# Patient Record
Sex: FEMALE | Race: BLACK OR AFRICAN AMERICAN | NOT HISPANIC OR LATINO | Employment: UNEMPLOYED | URBAN - METROPOLITAN AREA
[De-identification: names, ages, dates, MRNs, and addresses within clinical notes are randomized per-mention and may not be internally consistent; named-entity substitution may affect disease eponyms.]

---

## 2017-11-02 ENCOUNTER — HOSPITAL ENCOUNTER (OUTPATIENT)
Dept: RADIOLOGY | Facility: OTHER | Age: 42
Discharge: HOME | End: 2017-11-02
Attending: ORTHOPAEDIC SURGERY
Payer: MEDICARE

## 2017-11-02 DIAGNOSIS — S93.492A RUPTURE OF MEDIAL ANKLE LIGAMENT, LEFT, INITIAL ENCOUNTER: ICD-10-CM

## 2017-11-02 PROCEDURE — 73721 MRI JNT OF LWR EXTRE W/O DYE: CPT | Mod: 26,LT,, | Performed by: RADIOLOGY

## 2017-11-02 PROCEDURE — 73721 MRI JNT OF LWR EXTRE W/O DYE: CPT | Mod: TC,LT

## 2017-11-14 DIAGNOSIS — Z12.31 ENCOUNTER FOR SCREENING MAMMOGRAM FOR MALIGNANT NEOPLASM OF BREAST: Primary | ICD-10-CM

## 2018-05-02 DIAGNOSIS — M17.12 PRIMARY OSTEOARTHRITIS OF LEFT KNEE: ICD-10-CM

## 2018-05-02 DIAGNOSIS — E66.9 OBESITY, UNSPECIFIED: Primary | ICD-10-CM

## 2018-05-10 ENCOUNTER — CLINICAL SUPPORT (OUTPATIENT)
Dept: REHABILITATION | Facility: HOSPITAL | Age: 43
End: 2018-05-10
Attending: ORTHOPAEDIC SURGERY
Payer: MEDICARE

## 2018-05-10 DIAGNOSIS — M25.662 DECREASED ROM OF LEFT KNEE: ICD-10-CM

## 2018-05-10 PROCEDURE — G8978 MOBILITY CURRENT STATUS: HCPCS | Mod: CM,PO

## 2018-05-10 PROCEDURE — 97163 PT EVAL HIGH COMPLEX 45 MIN: CPT | Mod: PO

## 2018-05-10 PROCEDURE — G8979 MOBILITY GOAL STATUS: HCPCS | Mod: CK,PO

## 2018-05-11 NOTE — PLAN OF CARE
OUTPATIENT PHYSICAL THERAPY   PATIENT EVALUATION        Name: Morena Das  [facility] Number: [id number]        Diagnosis:   Encounter Diagnosis   Name Primary?    Decreased ROM of left knee      Physician: Tyler Carrera MD  Treatment Orders: PT Eval and Treat    No past medical history on file.  No current outpatient prescriptions on file.     No current facility-administered medications for this visit.      Review of patient's allergies indicates:  Allergies not on file      Precautions: standard    Evaluation Date: 5/10/18  Visit # authorized: 1  Authorization period: 12/31/18  Plan of care Expiration: 7/6/18      Subjective     Onset Date: September 2017  Prior Level of Function: independent  Social History: Pt lives with her parents. She lives in a two story home on the first floor with three steps to enter and no rail.      History of Present Illness: Morena is a 42 y.o. female that presents to [facility] secondary to L knee pain after stepping into a hole in front of her home last September. Morena states it got better over time but 2 1/2 weeks ago her knee started bucking and giving way. She also had pain and swelling. This new pain started from insidious onset. She went to the MD on May 1 and received a cortisone injection which did not help. She started walking with the axillary crutches again this week due to pain with weightbearing. She has been icing, compressing, and taking anti-inflammatories.  She went again to the MD yesterday, and he gave her a neoprene sleeve. He wanted her to try therapy and consider the synvisc injections. Her BP has bee high due to the pain. She has previous hx of the stroke about 6-7 years ago. 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 Her MD does not want to do surgery due to her complex medical history.     Imaging: MRI taken and pt's mother revealed it states both a medial/lateral meniscus tear and severe OA    Pain: current 10/10, worst 10/10, best  10/10, Throbbing and muscle spasms (every few minutes), constant  Radicular symptoms: none  Aggravating factors: turning over in the bed, weightbearing, knee movements  Easing lfactors: laying on her R side, elevating her knee, OTC pain meds    Pts goals: decrease the pain      No cultural, environmental, or spiritual barriers identified to treatment or learning.    Objective     Observation: Pt ambulates with axillary crutches and  L toe touch weightbearing due to pain. Pt wearing a neoprene sleeve on L knee.    Sit to stand: max B UE use, no weightbearing through L LE      Range of Motion:   Knee Left active Left Passive Right Active R passive   Flexion 87 WNL WNL WNL   Extension 0 0 WNL WNL     Fair + quad contraction  Severe pain, trembling, and min therapist assist required with SLR      Lower Extremity Strength  Right LE  Left LE    Knee extension: 5/5 Knee extension: NT due to pain   Knee flexion: 4+/5 Knee flexion: 4-/5   Hip flexion: 4+/5 Hip flexion: 4-/5   Ankle dorsiflexion: 4+/5 Ankle dorsiflexion: 4/5     Unable to tolerate special testing      Joint Mobility: unable to tolerate mobility testing      Palpation: severe tenderness in gastroc heads, distal quad, and medial knee joint    CMS Impairment/Limitation/Restriction for FOTO Knee Survey  Status Limitation G-Code CMS Severity Modifier  Intake 19% 82% Current Status CM - At least 80 percent but less than 100 percent  Predicted 42% 59% Goal Status+ CK - At least 40 percent but less than 60 percent      PT Evaluation Completed? Yes  Discussed Plan of Care with patient: Yes    TREATMENT:    Patient able to perform quad sets 20 x 5s holds submaximally. Trial with kinesiotape for patellar unloading (C-curve with a patellar strap)    Discussed trial with physical therapy next week. Focusing on non weight bearing activities and pain modalities (IFC or TENS- potentially for a home unit if helpful). If pt is unable to tolerate land therapy may also try out  aquatic therapy.    HEP provided: Instructed to perform quad sets at home throughout the day.  Instructed pt. Regarding: Proper technique with all exercises. Pt demo good understanding of the education provided. Morena demonstrated good return demonstration of activities.      Assessment     Morena is a 42 y.o. female referred to outpatient physical therapy with a medical diagnosis of L knee pain. She is in severe pain at this time unable to ambulate without assistance. Her L knee is very sensitive to touch, and she had difficulty tolerating the initial evaluation. Extreme pain reported with any weightbearing on her L knee and with knee flexion ROM. 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 She also has high blood pressure which is further exacerbated by her current pain levels. Pt and pt's mother educated on performing a trial with land therapy with vitals being monitored closely. Pt may not be able to tolerate therex; and therefore she will be referred back to the MD or to aquatic therapy.Demonstrates impairments including: limitations as described in the problem list. Pt prognosis is Fair. Negative prognostic factors include severity of pain, BMI, comorbidities. Pt will benefit from skilled outpatient physical therapy to address the above stated deficits, provide pt/family education, and to maximize pt's level of independence.     Medical necessity is demonstrated by the following IMPAIRMENTS/PROBLEMS:  weakness, impaired endurance, impaired self care skills, impaired functional mobility, gait instability, impaired balance, decreased lower extremity function, pain and decreased ROM    History  Co-morbidities and personal factors that may impact the plan of care Examination  Body Structures and Functions, activity limitations and participation restrictions that may impact the plan of care    Clinical Presentation    Co-morbidities:   Prior CVA, HTN, prior pelvic tumor, undiagnosed autoimmune disease        Personal Factors:   no deficits Body Regions:   lower extremities    Body Systems:    gross symmetry  ROM  strength  gross coordinated movement  balance  gait  transfers  transitions            Participation Restrictions:   Unable to weight bear, transfer, bend the knee     Activity limitations:   Learning and applying knowledge  no deficits    General Tasks and Commands  no deficits    Communication  no deficits    Mobility  lifting and carrying objects  walking  driving (bike, car, motorcycle)    Self care  washing oneself (bathing, drying, washing hands)  toileting  dressing  looking after one's health    Domestic Life  shopping  cooking  doing house work (cleaning house, washing dishes, laundry)  assisting others    Interactions/Relationships  no deficits    Life Areas  no deficits    Community and Social Life  community life  recreation and leisure         unstable clinical presentation with unpredictable characteristics                      high   high  high Decision Making/ Complexity Score:  high         Anticipated Barriers for physical therapy: none      GOALS: Short Term Goals:  4 weeks    - Pt will report decreased knee pain to < or = 3/10 in order to increase tolerance for functional mobility around the home.  - Pt will perform sit to stand transfers with B UE assist and equal weightbearing in order to improve functional mobility.  - Pt will increase knee flexion ROM by at least 15 degrees in order to show improved functional mobility.  - Pt will be able to ambulate without an AD for at least 200' in order to return to PLOF and improve activity tolerance.  - Pt will be independent and consistent with issued HEP in order to show carryover between therapy sessions.        Plan       Recommended Treatment Plan: Pt will be treated by physical therapy 1-2 times a week for 4 weeks for pt education, HEP, therapeutic  exercises, neuromuscular re-education, manual therapy, gait training, balance/proprioceptive activities, modalities prn to achieve established goals.  Morena may at times be seen by a PTA as part of the Rehab Team.     Other Recommendations: possible aquatic therapy is pt is unable to tolerate land therapy      Therapist: Ashley Holstein, PT    I CERTIFY THE NEED FOR THESE SERVICES FURNISHED UNDER THIS PLAN OF TREATMENT AND WHILE UNDER MY CARE    Physician's comments: ________________________________________________________________________________________________________________________________________________      Physician's Name: ___________________________________

## 2018-05-15 ENCOUNTER — CLINICAL SUPPORT (OUTPATIENT)
Dept: REHABILITATION | Facility: HOSPITAL | Age: 43
End: 2018-05-15
Attending: ORTHOPAEDIC SURGERY
Payer: MEDICARE

## 2018-05-15 DIAGNOSIS — M25.662 DECREASED ROM OF LEFT KNEE: Primary | ICD-10-CM

## 2018-05-15 PROCEDURE — 97110 THERAPEUTIC EXERCISES: CPT | Mod: PO

## 2018-05-15 NOTE — PROGRESS NOTES
Name: Morena Das  Clinic Number: [id number]  Date of Treatment: 05/15/2018  Diagnosis:   Encounter Diagnosis   Name Primary?    Decreased ROM of left knee Yes       Physician: Tyler Carrera MD    Evaluation Date: 5/10/18  Visit # authorized: 2 / 8   PTA Visit Number: 1   Authorization period: 06/08/2018  Plan of care Expiration: 7/6/18    Time in: 03:00 pm   Time Out: 03:40 pm   Total Treatment Time: 40 minutes   Billable Time: 30 minutes       Subjective:    Morena Das reports worsening of symptoms.  Patient reports their pain to be 7/10 on a 0-10 scale with 0 being no pain and 10 being the worst pain imaginable. Reports pain with most movement and bhumika with weight bearing     Objective    Patient received individual therapy to increase strength, endurance, ROM, flexibility, posture and core stabilization    Morena Das was instructed in and performed therapeutic exercises to develop strength, endurance, ROM, flexibility, posture and core stabilization for 25 minutes including:        Quad sets 20 x 5s holds submaximally  Supine hip abduction on slide board: 2 x 10   Active assistive heel slides with strap and assistance from therapist.      Trial with kinesiotape for patellar unloading (C-curve with a patellar strap)         Morena Das received the following manual therapy techniques: Gentle Soft tissue Mobilization were applied to the: quads and hamstrings  for 5 minutes.     IFC to left knee x 10 minutes with ice post treatment session     Written Home Exercises Provided: Reviewed current home exercise program and encouraged compliance  Pt demo good understanding of the education provided. Morena Das demonstrated good return demonstration of activities.     Assessment:     Patient tolerated therapy session poorly due to increased complaints of pain. Will continue to benefit from skilled physical therapy to aid in decreasing pain and symptoms and improve over function.  Pt will  continue to benefit from skilled PT intervention. Medical Necessity is demonstrated by:  Unable to participate in daily activities, Continued inability to participate in vocational pursuits, Pain limits function of effected part for some activities, Unable to participate fully in daily activities, Requires skilled supervision to complete and progress HEP, Weakness and Edema.    Patient is making good progress towards established goals.    GOALS: Short Term Goals:  4 weeks     - Pt will report decreased knee pain to < or = 3/10 in order to increase tolerance for functional mobility around the home.  - Pt will perform sit to stand transfers with B UE assist and equal weightbearing in order to improve functional mobility.  - Pt will increase knee flexion ROM by at least 15 degrees in order to show improved functional mobility.  - Pt will be able to ambulate without an AD for at least 200' in order to return to PLOF and improve activity tolerance.  - Pt will be independent and consistent with issued HEP in order to show carryover between therapy sessions.       New/Revised goals: None at this time.       Plan:  Continue with established Plan of Care towards PT goals.

## 2018-09-27 ENCOUNTER — HOSPITAL ENCOUNTER (OUTPATIENT)
Dept: PREADMISSION TESTING | Facility: OTHER | Age: 43
Discharge: HOME | End: 2018-09-27
Attending: ORTHOPAEDIC SURGERY
Payer: MEDICARE

## 2018-09-27 ENCOUNTER — ANESTHESIA EVENT (OUTPATIENT)
Dept: SURGERY | Facility: OTHER | Age: 43
End: 2018-09-27
Payer: MEDICARE

## 2018-09-27 VITALS
WEIGHT: 275 LBS | OXYGEN SATURATION: 100 % | TEMPERATURE: 98 F | HEIGHT: 71 IN | BODY MASS INDEX: 38.5 KG/M2 | SYSTOLIC BLOOD PRESSURE: 150 MMHG | HEART RATE: 88 BPM | DIASTOLIC BLOOD PRESSURE: 80 MMHG

## 2018-09-27 RX ORDER — LIDOCAINE HYDROCHLORIDE 10 MG/ML
0.5 INJECTION, SOLUTION EPIDURAL; INFILTRATION; INTRACAUDAL; PERINEURAL ONCE
Status: CANCELLED | OUTPATIENT
Start: 2018-09-27 | End: 2018-09-27

## 2018-09-27 RX ORDER — MELOXICAM 15 MG/1
15 TABLET ORAL DAILY
COMMUNITY
End: 2018-11-14

## 2018-09-27 RX ORDER — SODIUM CHLORIDE, SODIUM LACTATE, POTASSIUM CHLORIDE, CALCIUM CHLORIDE 600; 310; 30; 20 MG/100ML; MG/100ML; MG/100ML; MG/100ML
INJECTION, SOLUTION INTRAVENOUS CONTINUOUS
Status: CANCELLED | OUTPATIENT
Start: 2018-09-27

## 2018-09-27 RX ORDER — FAMOTIDINE 10 MG/ML
20 INJECTION INTRAVENOUS ONCE
Status: CANCELLED | OUTPATIENT
Start: 2018-09-27 | End: 2018-09-27

## 2018-09-27 RX ORDER — ZOLPIDEM TARTRATE 5 MG/1
5 TABLET ORAL NIGHTLY PRN
COMMUNITY

## 2018-09-27 RX ORDER — TOPIRAMATE 100 MG/1
100 TABLET, FILM COATED ORAL DAILY
COMMUNITY

## 2018-09-27 RX ORDER — HYDROCHLOROTHIAZIDE 12.5 MG/1
12.5 TABLET ORAL DAILY
COMMUNITY

## 2018-09-27 RX ORDER — PREGABALIN 75 MG/1
75 CAPSULE ORAL ONCE
Status: CANCELLED | OUTPATIENT
Start: 2018-09-27 | End: 2018-09-27

## 2018-09-27 RX ORDER — LATANOPROST 50 UG/ML
1 SOLUTION/ DROPS OPHTHALMIC NIGHTLY
COMMUNITY

## 2018-09-27 RX ORDER — FAMOTIDINE 20 MG/1
20 TABLET, FILM COATED ORAL
Status: CANCELLED | OUTPATIENT
Start: 2018-09-27 | End: 2018-09-27

## 2018-09-27 RX ORDER — TIZANIDINE 4 MG/1
4 TABLET ORAL EVERY 8 HOURS PRN
COMMUNITY

## 2018-09-27 RX ORDER — VERAPAMIL HYDROCHLORIDE 80 MG/1
80 TABLET ORAL 2 TIMES DAILY
COMMUNITY

## 2018-09-27 NOTE — DISCHARGE INSTRUCTIONS
PRE-ADMIT TESTING -  [phone]    [address]  [facility]          Your surgery has been scheduled at [facility]. We are pleased to have the opportunity to serve you. For Further Information please call [phone].    On the day of surgery please report to the Information Desk on the 1st floor.    · CONTACT YOUR PHYSICIAN'S OFFICE THE DAY PRIOR TO YOUR SURGERY TO OBTAIN YOUR ARRIVAL TIME.     · The evening before surgery do not eat anything after 9 p.m. ( this includes hard candy, chewing gum and mints).  You may only have GATORADE, POWERADE AND WATER  from 9 p.m. until you leave your home.   DO NOT DRINK ANY LIQUIDS ON THE WAY TO THE HOSPITAL.      SPECIAL MEDICATION INSTRUCTIONS: TAKE medications checked off by the Anesthesiologist on your Medication List.    Angiogram Patients: Take medications as instructed by your physician, including aspirin.     Surgery Patients:    If you take ASPIRIN - Your PHYSICIAN/SURGEON will need to inform you IF/OR when you need to stop taking aspirin prior to your surgery.     Do Not take any medications containing IBUPROFEN.  Do Not Wear any make-up or dark nail polish   (especially eye make-up) to surgery. If you come to surgery with makeup on you will be required to remove the makeup or nail polish.    Do not shave your surgical area at least 5 days prior to your surgery. The surgical prep will be performed at the hospital according to Infection Control regulations.    Leave all valuables at home.   Do Not wear any jewelry or watches, including any metal in body piercings.  Contact Lens must be removed before surgery. Either do not wear the contact lens or bring a case and solution for storage.  Please bring a container for eyeglasses or dentures as required.  Bring any paperwork your physician has provided, such as consent forms,  history and physicals, doctor's orders, etc.   Bring comfortable clothes that are loose fitting to wear upon  discharge. Take into consideration the type of surgery being performed.  Maintain your diet as advised per your physician the day prior to surgery.      Adequate rest the night before surgery is advised.   Park in the Parking lot behind the hospital or in the [facility] across the street from the parking lot. Parking is complimentary.  If you will be discharged the same day as your procedure, please arrange for a responsible adult to drive you home or to accompany you if traveling by taxi.   YOU WILL NOT BE PERMITTED TO DRIVE OR TO LEAVE THE HOSPITAL ALONE AFTER SURGERY.   It is strongly recommended that you arrange for someone to remain with you for the first 24 hrs following your surgery.       Thank you for your cooperation.  The Staff of [facility].        Bathing Instructions                                                                 Please shower the evening before and morning of your procedure with    ANTIBACTERIAL SOAP. ( DIAL, etc )  Concentrate on the surgical area   for at least 3 minutes and rinse completely. Dry off as usual.   Do not use any deodorant, powder, body lotions, perfume, after shave or    cologne.

## 2018-09-27 NOTE — ANESTHESIA PREPROCEDURE EVALUATION
09/27/2018  [place] [facility] Lexis Das is a 43 y.o., female.    Anesthesia Evaluation    I have reviewed the Patient Summary Reports.    I have reviewed the Nursing Notes.   I have reviewed the Medications.     Review of Systems  Anesthesia Hx:  No problems with previous Anesthesia  Denies Family Hx of Anesthesia complications.   Denies Personal Hx of Anesthesia complications.   Social:  Non-Smoker    Hematology/Oncology:  Hematology Normal   Oncology Normal   Oncology Comments: LN bx for autoimmune disease, R arm, no with lymphedema    2011 6 year course with lymphadenopathy, profound weakness, bed bound, now completely resolved, never found etiology     EENT/Dental:EENT/Dental Normal   Cardiovascular:   Hypertension    Pulmonary:  Pulmonary Normal    Renal/:  Renal/ Normal     Hepatic/GI:  Hepatic/GI Normal    Musculoskeletal:   Arthritis     Neurological:   Headaches Denies Seizures.    Endocrine:  Endocrine Normal    Dermatological:  Skin Normal    Psych:  Psychiatric Normal           Physical Exam  General:  Well nourished, Morbid Obesity    Airway/Jaw/Neck:  Airway Findings: Mouth Opening: Normal Mallampati: II      Dental:  Dental Findings: Edentulous        Mental Status:  Mental Status Findings:  Cooperative, Alert and Oriented         Anesthesia Plan  Type of Anesthesia, risks & benefits discussed:  Anesthesia Type:  general  Patient's Preference:   Intra-op Monitoring Plan: standard ASA monitors  Intra-op Monitoring Plan Comments:   Post Op Pain Control Plan: multimodal analgesia  Post Op Pain Control Plan Comments:   Induction:   IV  Beta Blocker:         Informed Consent: Patient understands risks and agrees with Anesthesia plan.  Questions answered. Anesthesia consent signed with patient.  ASA Score: 3     Day of Surgery Review of History & Physical:    H&P update referred to the surgeon.      Anesthesia Plan Notes: I state K in HA, 4.2        Ready For Surgery From Anesthesia Perspective.

## 2018-10-03 ENCOUNTER — ANESTHESIA (OUTPATIENT)
Dept: SURGERY | Facility: OTHER | Age: 43
End: 2018-10-03
Payer: MEDICARE

## 2018-10-03 PROBLEM — I10 ESSENTIAL HYPERTENSION: Status: ACTIVE | Noted: 2018-10-03

## 2018-10-03 PROBLEM — S83.207A TEAR OF MENISCUS OF LEFT KNEE: Status: ACTIVE | Noted: 2018-10-03

## 2018-10-03 PROCEDURE — 63600175 PHARM REV CODE 636 W HCPCS: Performed by: ANESTHESIOLOGY

## 2018-10-03 PROCEDURE — 25000003 PHARM REV CODE 250: Performed by: ANESTHESIOLOGY

## 2018-10-03 PROCEDURE — 25000003 PHARM REV CODE 250: Performed by: NURSE ANESTHETIST, CERTIFIED REGISTERED

## 2018-10-03 PROCEDURE — 63600175 PHARM REV CODE 636 W HCPCS: Performed by: NURSE ANESTHETIST, CERTIFIED REGISTERED

## 2018-10-03 PROCEDURE — 64447 NJX AA&/STRD FEMORAL NRV IMG: CPT | Mod: 59 | Performed by: ANESTHESIOLOGY

## 2018-10-03 PROCEDURE — 63600175 PHARM REV CODE 636 W HCPCS: Performed by: PHYSICIAN ASSISTANT

## 2018-10-03 RX ORDER — DEXAMETHASONE SODIUM PHOSPHATE 4 MG/ML
INJECTION, SOLUTION INTRA-ARTICULAR; INTRALESIONAL; INTRAMUSCULAR; INTRAVENOUS; SOFT TISSUE
Status: DISCONTINUED | OUTPATIENT
Start: 2018-10-03 | End: 2018-10-03

## 2018-10-03 RX ORDER — KETOROLAC TROMETHAMINE 30 MG/ML
INJECTION, SOLUTION INTRAMUSCULAR; INTRAVENOUS
Status: DISCONTINUED | OUTPATIENT
Start: 2018-10-03 | End: 2018-10-03

## 2018-10-03 RX ORDER — PROPOFOL 10 MG/ML
VIAL (ML) INTRAVENOUS
Status: DISCONTINUED | OUTPATIENT
Start: 2018-10-03 | End: 2018-10-03

## 2018-10-03 RX ORDER — ACETAMINOPHEN 10 MG/ML
INJECTION, SOLUTION INTRAVENOUS
Status: DISCONTINUED | OUTPATIENT
Start: 2018-10-03 | End: 2018-10-03

## 2018-10-03 RX ORDER — NEOSTIGMINE METHYLSULFATE 1 MG/ML
INJECTION, SOLUTION INTRAVENOUS
Status: DISCONTINUED | OUTPATIENT
Start: 2018-10-03 | End: 2018-10-03

## 2018-10-03 RX ORDER — HYDROMORPHONE HYDROCHLORIDE 2 MG/ML
INJECTION, SOLUTION INTRAMUSCULAR; INTRAVENOUS; SUBCUTANEOUS
Status: DISCONTINUED | OUTPATIENT
Start: 2018-10-03 | End: 2018-10-03

## 2018-10-03 RX ORDER — GLYCOPYRROLATE 0.2 MG/ML
INJECTION INTRAMUSCULAR; INTRAVENOUS
Status: DISCONTINUED | OUTPATIENT
Start: 2018-10-03 | End: 2018-10-03

## 2018-10-03 RX ORDER — ROCURONIUM BROMIDE 10 MG/ML
INJECTION, SOLUTION INTRAVENOUS
Status: DISCONTINUED | OUTPATIENT
Start: 2018-10-03 | End: 2018-10-03

## 2018-10-03 RX ORDER — LIDOCAINE HCL/PF 100 MG/5ML
SYRINGE (ML) INTRAVENOUS
Status: DISCONTINUED | OUTPATIENT
Start: 2018-10-03 | End: 2018-10-03

## 2018-10-03 RX ORDER — ROPIVACAINE HYDROCHLORIDE 5 MG/ML
INJECTION, SOLUTION EPIDURAL; INFILTRATION; PERINEURAL
Status: DISCONTINUED | OUTPATIENT
Start: 2018-10-03 | End: 2018-10-03

## 2018-10-03 RX ORDER — FENTANYL CITRATE 50 UG/ML
INJECTION, SOLUTION INTRAMUSCULAR; INTRAVENOUS
Status: DISCONTINUED | OUTPATIENT
Start: 2018-10-03 | End: 2018-10-03

## 2018-10-03 RX ORDER — MIDAZOLAM HYDROCHLORIDE 1 MG/ML
INJECTION INTRAMUSCULAR; INTRAVENOUS
Status: DISCONTINUED | OUTPATIENT
Start: 2018-10-03 | End: 2018-10-03

## 2018-10-03 RX ORDER — ONDANSETRON HYDROCHLORIDE 2 MG/ML
INJECTION, SOLUTION INTRAMUSCULAR; INTRAVENOUS
Status: DISCONTINUED | OUTPATIENT
Start: 2018-10-03 | End: 2018-10-03

## 2018-10-03 RX ADMIN — GLYCOPYRROLATE 0.6 MG: 0.2 INJECTION, SOLUTION INTRAMUSCULAR; INTRAVENOUS at 09:10

## 2018-10-03 RX ADMIN — FENTANYL CITRATE 100 MCG: 50 INJECTION, SOLUTION INTRAMUSCULAR; INTRAVENOUS at 09:10

## 2018-10-03 RX ADMIN — SODIUM CHLORIDE, SODIUM LACTATE, POTASSIUM CHLORIDE, AND CALCIUM CHLORIDE: 600; 310; 30; 20 INJECTION, SOLUTION INTRAVENOUS at 08:10

## 2018-10-03 RX ADMIN — NEOSTIGMINE METHYLSULFATE 5 MG: 1 INJECTION INTRAVENOUS at 09:10

## 2018-10-03 RX ADMIN — PROPOFOL 30 MG: 10 INJECTION, EMULSION INTRAVENOUS at 09:10

## 2018-10-03 RX ADMIN — CARBOXYMETHYLCELLULOSE SODIUM 2 DROP: 2.5 SOLUTION/ DROPS OPHTHALMIC at 09:10

## 2018-10-03 RX ADMIN — LIDOCAINE HYDROCHLORIDE 50 MG: 20 INJECTION, SOLUTION INTRAVENOUS at 09:10

## 2018-10-03 RX ADMIN — ROCURONIUM BROMIDE 50 MG: 10 INJECTION, SOLUTION INTRAVENOUS at 09:10

## 2018-10-03 RX ADMIN — HYDROMORPHONE HYDROCHLORIDE 1 MG: 2 INJECTION INTRAMUSCULAR; INTRAVENOUS; SUBCUTANEOUS at 10:10

## 2018-10-03 RX ADMIN — CEFAZOLIN SODIUM 2 G: 2 SOLUTION INTRAVENOUS at 09:10

## 2018-10-03 RX ADMIN — PROPOFOL 200 MG: 10 INJECTION, EMULSION INTRAVENOUS at 09:10

## 2018-10-03 RX ADMIN — ONDANSETRON 4 MG: 2 INJECTION, SOLUTION INTRAMUSCULAR; INTRAVENOUS at 09:10

## 2018-10-03 RX ADMIN — MIDAZOLAM HYDROCHLORIDE 2 MG: 1 INJECTION, SOLUTION INTRAMUSCULAR; INTRAVENOUS at 08:10

## 2018-10-03 RX ADMIN — KETOROLAC TROMETHAMINE 30 MG: 30 INJECTION, SOLUTION INTRAMUSCULAR; INTRAVENOUS at 09:10

## 2018-10-03 RX ADMIN — DEXAMETHASONE SODIUM PHOSPHATE 8 MG: 4 INJECTION, SOLUTION INTRAMUSCULAR; INTRAVENOUS at 09:10

## 2018-10-03 RX ADMIN — ROPIVACAINE HYDROCHLORIDE 30 ML: 5 INJECTION, SOLUTION EPIDURAL; INFILTRATION; PERINEURAL at 12:10

## 2018-10-03 RX ADMIN — ACETAMINOPHEN 1000 MG: 10 INJECTION, SOLUTION INTRAVENOUS at 09:10

## 2018-10-03 NOTE — ANESTHESIA POSTPROCEDURE EVALUATION
"Anesthesia Post Evaluation    Patient: Morena Das    Procedure(s) Performed: Procedure(s) (LRB):  ARTHROSCOPY, KNEE - ARTHROSCOPY LATERAL AND MEDIAL MENISCECTOMY (Left)  CHONDROPLASTY, KNEE (Left)    Final Anesthesia Type: general  Patient location during evaluation: PACU  Patient participation: Yes- Able to Participate  Level of consciousness: awake and alert  Post-procedure vital signs: reviewed and stable  Pain management: adequate (see below)  Airway patency: patent  PONV status at discharge: No PONV  Anesthetic complications: no      Cardiovascular status: blood pressure returned to baseline and stable  Respiratory status: unassisted, spontaneous ventilation and room air  Hydration status: euvolemic  Follow-up not needed.  Comments: Required unplanned post op block due to difficulty managing pain.        Visit Vitals  BP (!) 162/72   Pulse 98   Temp 36.7 °C (98.1 °F) (Oral)   Resp 18   Ht 5' 11" (1.803 m)   Wt 124.7 kg (274 lb 16 oz)   SpO2 100%   Breastfeeding? No   BMI 38.35 kg/m²       Pain/Marisel Score: Pain Assessment Performed: Yes (10/3/2018 12:56 PM)  Presence of Pain: complains of pain/discomfort (10/3/2018 12:56 PM)  Pain Rating Prior to Med Admin: 7 (10/3/2018 12:02 PM)  Pain Rating Post Med Admin: 6 (10/3/2018 12:56 PM)  Marisel Score: 10 (10/3/2018 12:56 PM)        "

## 2018-10-03 NOTE — ANESTHESIA PROCEDURE NOTES
Adductor canal block    Patient location during procedure: post-op   Block not for primary anesthetic.  Reason for block: at surgeon's request and post-op pain management   Post-op Pain Location: L knee pain  Start time: 10/3/2018 12:44 PM  Timeout: 10/3/2018 12:42 PM   End time: 10/3/2018 12:52 PM  Staffing  Anesthesiologist: Bren David MD  Performed: anesthesiologist   Preanesthetic Checklist  Completed: patient identified, site marked, surgical consent, pre-op evaluation, timeout performed, IV checked, risks and benefits discussed and monitors and equipment checked  Peripheral Block  Patient position: supine  Prep: ChloraPrep  Patient monitoring: heart rate, cardiac monitor, continuous pulse ox and frequent blood pressure checks  Block type: adductor canal  Laterality: left  Injection technique: single shot  Needle  Needle type: Stimuplex   Needle gauge: 22 G  Needle length: 3.5 in  Needle localization: nerve stimulator and ultrasound guidance   -ultrasound image captured on disc.  Assessment  Injection assessment: negative aspiration, negative parasthesia and local visualized surrounding nerve  Paresthesia pain: none  Heart rate change: no  Slow fractionated injection: yes  Additional Notes  Pt with intractable pain following knee surgery, despite high dose dilaudid and multimodal tx. Discussed with surgeon, OK'd block. Pt wide awake, wishes us to proceed, procedure on original consent.

## 2018-10-03 NOTE — TRANSFER OF CARE
"Anesthesia Transfer of Care Note    Patient: Morena Das    Procedure(s) Performed: Procedure(s) (LRB):  ARTHROSCOPY, KNEE - ARTHROSCOPY LATERAL AND MEDIAL MENISCECTOMY (Left)  CHONDROPLASTY, KNEE (Left)    Patient location: PACU    Anesthesia Type: general    Transport from OR: Transported from OR on 2-3 L/min O2 by NC with adequate spontaneous ventilation    Post pain: pain needs to be addressed (Additional pain med as charted.)    Post assessment: no apparent anesthetic complications    Post vital signs: stable    Level of consciousness: awake and alert    Nausea/Vomiting: no nausea/vomiting    Complications: none    Transfer of care protocol was followed      Last vitals:   Visit Vitals  BP (!) 143/88 (BP Location: Left arm, Patient Position: Lying)   Pulse 98   Temp 36.8 °C (98.3 °F) (Oral)   Resp 18   Ht 5' 11" (1.803 m)   Wt 124.7 kg (274 lb 16 oz)   SpO2 100%   Breastfeeding? No   BMI 38.35 kg/m²     "

## 2018-10-04 PROBLEM — G43.909 MIGRAINE SYNDROME: Chronic | Status: ACTIVE | Noted: 2018-10-04

## 2018-10-04 PROBLEM — I10 ESSENTIAL HYPERTENSION: Chronic | Status: ACTIVE | Noted: 2018-10-03

## 2018-11-01 ENCOUNTER — TELEPHONE (OUTPATIENT)
Dept: SPORTS MEDICINE | Facility: CLINIC | Age: 43
End: 2018-11-01

## 2018-11-14 ENCOUNTER — HOSPITAL ENCOUNTER (OUTPATIENT)
Dept: RADIOLOGY | Facility: HOSPITAL | Age: 43
Discharge: HOME | End: 2018-11-14
Attending: ORTHOPAEDIC SURGERY
Payer: MEDICARE

## 2018-11-14 ENCOUNTER — OFFICE VISIT (OUTPATIENT)
Dept: SPORTS MEDICINE | Facility: CLINIC | Age: 43
End: 2018-11-14
Payer: MEDICARE

## 2018-11-14 VITALS
SYSTOLIC BLOOD PRESSURE: 165 MMHG | HEART RATE: 98 BPM | HEIGHT: 71 IN | DIASTOLIC BLOOD PRESSURE: 100 MMHG | WEIGHT: 274 LBS | BODY MASS INDEX: 38.36 KG/M2

## 2018-11-14 DIAGNOSIS — M25.662 DECREASED ROM OF LEFT KNEE: ICD-10-CM

## 2018-11-14 DIAGNOSIS — M79.675 TOE PAIN, LEFT: ICD-10-CM

## 2018-11-14 DIAGNOSIS — M22.42 CHONDROMALACIA, PATELLA, LEFT: ICD-10-CM

## 2018-11-14 DIAGNOSIS — M25.562 LEFT KNEE PAIN, UNSPECIFIED CHRONICITY: ICD-10-CM

## 2018-11-14 DIAGNOSIS — M94.262 CHONDROMALACIA, KNEE, LEFT: ICD-10-CM

## 2018-11-14 DIAGNOSIS — M25.562 LEFT KNEE PAIN, UNSPECIFIED CHRONICITY: Primary | ICD-10-CM

## 2018-11-14 DIAGNOSIS — S83.272A COMPLEX TEAR OF LATERAL MENISCUS OF LEFT KNEE AS CURRENT INJURY, INITIAL ENCOUNTER: ICD-10-CM

## 2018-11-14 DIAGNOSIS — E66.9 OBESITY, UNSPECIFIED CLASSIFICATION, UNSPECIFIED OBESITY TYPE, UNSPECIFIED WHETHER SERIOUS COMORBIDITY PRESENT: ICD-10-CM

## 2018-11-14 PROCEDURE — 99214 OFFICE O/P EST MOD 30 MIN: CPT | Mod: PBBFAC,25,PO | Performed by: ORTHOPAEDIC SURGERY

## 2018-11-14 PROCEDURE — 73630 X-RAY EXAM OF FOOT: CPT | Mod: 26,50,, | Performed by: RADIOLOGY

## 2018-11-14 PROCEDURE — 73630 X-RAY EXAM OF FOOT: CPT | Mod: 50,TC,FY,PO

## 2018-11-14 PROCEDURE — 73564 X-RAY EXAM KNEE 4 OR MORE: CPT | Mod: 26,50,, | Performed by: RADIOLOGY

## 2018-11-14 PROCEDURE — 73564 X-RAY EXAM KNEE 4 OR MORE: CPT | Mod: TC,50,FY,PO

## 2018-11-14 PROCEDURE — 99214 OFFICE O/P EST MOD 30 MIN: CPT | Mod: S$PBB,,, | Performed by: ORTHOPAEDIC SURGERY

## 2018-11-14 PROCEDURE — 99999 PR PBB SHADOW E&M-EST. PATIENT-LVL IV: CPT | Mod: PBBFAC,,, | Performed by: ORTHOPAEDIC SURGERY

## 2018-11-14 RX ORDER — MELOXICAM 15 MG/1
15 TABLET ORAL DAILY
Qty: 30 TABLET | Refills: 2 | Status: SHIPPED | OUTPATIENT
Start: 2018-11-14 | End: 2018-12-14

## 2018-11-14 NOTE — PROGRESS NOTES
Subjective:          Chief Complaint: Morena Das is a 43 y.o. female who had concerns including Pain of the Left Knee.    42 yo F here today for L knee pain. Had a L knee meniscal tear last year after falling and twisting her knee in September 2017. She tried injections and therapy which did not help. She had a knee arthroscopy last month by Dr. Hernandez in October 3rd, 2018 for menisectomy. She did have HTN post op requirng admission overnight for observation. She has not had any relief from surgery. She is on crutches. She was taking xanaflex for muscle spasms, meloxicam, and oxycodone for pain. She is out of meloxicam and oxycodone. She is doing aquatic therapy at [facility]. She states she was tole by Dr. Hernandez that she would likely need a knee replacement. She also states that she dropped a jar of pickles on her toe last night and is in pain from that.     Per patient and mom, she states she has an undiagnosed autoimmune disease. She is followed by her PCP Dr. Nellie Stafford at [facility]. She is not on any medications other than meloxicam for this.    Surgery Date: 10/3/2018      Surgeon(s) and Role:     * Brandon Hernandez Jr., MD - Primary     Assisting Surgeon: None     Assistants: Taye Ayon PA-C     Pre-op Diagnosis:  Peripheral tear of medial meniscus of left knee as current injury, initial encounter (S83.222A)  Peripheral tear of lateral meniscus of left knee as current injury, initial encounter (S83.262A)     Post-op Diagnosis:  Post-Op Diagnosis Codes:     * Peripheral tear of medial meniscus of left knee as current injury, initial encounter (S83.222A)     * Peripheral tear of lateral meniscus of left knee as current injury, initial encounter (S83.262A)     Procedure(s) (LRB):  ARTHROSCOPY, KNEE - ARTHROSCOPY LATERAL AND MEDIAL MENISCECTOMY (Left)  CHONDROPLASTY, KNEE (Left)    Findings: 1.  Left knee medial femoral condyle, grade IV lesion with loose flap.  2.  Left knee trochlear lesion  grade IV with loose flaps, extensive.  3.  Lateral meniscus tear.  4.  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            Review of Systems   Constitution: Negative for chills and fever.   HENT: Negative for ear pain and tinnitus.    Eyes: Negative for photophobia and visual disturbance.   Cardiovascular: Negative for chest pain and claudication.   Respiratory: Negative for cough and shortness of breath.    Endocrine: Negative for cold intolerance and heat intolerance.   Hematologic/Lymphatic: Negative for adenopathy. Does not bruise/bleed easily.   Skin: Negative for poor wound healing and rash.   Musculoskeletal: Positive for joint pain. Negative for back pain.   Gastrointestinal: Negative for nausea and vomiting.   Genitourinary: Negative for flank pain and hematuria.   Neurological: Negative for dizziness and headaches.   Psychiatric/Behavioral: Negative for altered mental status. The patient is not nervous/anxious.        Pain Related Questions  Over the past 3 days, what was your average pain during activity? (I.e. running, jogging, walking, climbing stairs, getting dressed, ect.): 8  Over the past 3 days, what was your highest pain level?: 10  Over the past 3 days, what was your lowest pain level? : 6    Other  How many nights a week are you awakened by your affected body part?: 7  Was the patient's HEIGHT measured or patient reported?: Patient Reported  Was the patient's WEIGHT measured or patient reported?: Measured      Objective:        General: Morena is well-developed, well-nourished, appears stated age, in no acute distress, alert and oriented to time, place and person.     General    Vitals reviewed.  Constitutional: She is oriented to person, place, and time. She appears well-developed and well-nourished. No distress.   HENT:   Mouth/Throat: No oropharyngeal exudate.   Eyes: Right eye exhibits no discharge. Left eye exhibits no discharge.   Neck: Normal range of motion.   Cardiovascular:  Normal rate.    Pulmonary/Chest: Effort normal and breath sounds normal. No respiratory distress.   Neurological: She is alert and oriented to person, place, and time. She has normal reflexes. No cranial nerve deficit. Coordination normal.   Psychiatric: She has a normal mood and affect. Her behavior is normal. Judgment and thought content normal.     General Musculoskeletal Exam   Gait: normal     Right Ankle/Foot Exam     Inspection   Scars: absent  Deformity: absent  Erythema: absent  Bruising: Ankle - absent Foot - present  Effusion: Ankle - absent Foot - absent  Atrophy: Ankle - absent Foot - absent    Range of Motion   Ankle Joint   Dorsiflexion:  10 normal   Plantar flexion:  35 normal   Subtalar Joint   Inversion:  15 normal   Eversion:  5 normal   Salmon Test:  negative  First MTP Joint: normal    Alignment   Knee Alignment: neutral  Hindfoot Alignment: neutral  Midfoot Alignment: normal  Forefoot Alignment: normal    Tests   Anterior drawer: 1+  Varus tilt: 1+  Heel Walk: able to perform  Tiptoe Walk: able to perform  Single Heel Rise: able to perform  External Rotation Test: negative  Squeeze Test: negative    Other   Ankle Crepitus: absent  Foot Crepitus:  absent  Sensation: normal  Peroneal Subluxation: negative    Comments:  Ttp 3-5th phalanges     Left Ankle/Foot Exam     Inspection  Deformity: absent  Erythema: absent  Bruising: Ankle - absent Foot - absent  Effusion: Ankle - absent Foot - absent  Atrophy: Ankle - absent Foot - absent  Scars: absent    Range of Motion   Ankle Joint  Dorsiflexion:  10 normal   Plantar flexion:  35 normal     Subtalar Joint   Inversion:  15 normal   Eversion:  5 normal   Salmon Test:  normal  First MTP Joint: normal    Alignment   Knee Alignment: neutral  Hindfoot Alignment: neutral  Midfoot Alignment: normal  Forefoot Alignment: normal    Tests   Anterior drawer: 1+  Varus tilt: 1+  Heel Walk: able to perform  Tiptoe Walk: able to perform  Single Heel Rise: able  to perform  External Rotation Test: negative  Squeeze Test: absent    Other   Foot Crepitus:  absent  Ankle Crepitus: absent  Sensation: normal  Peroneal Subluxation: negative    Right Knee Exam     Inspection   Erythema: absent  Scars: absent  Swelling: absent  Effusion: absent  Deformity: absent  Bruising: absent    Tenderness   The patient is experiencing no tenderness.     Range of Motion   Extension: 0   Flexion: 140     Tests   Meniscus   Kev:  Medial - negative Lateral - negative  Ligament Examination Lachman: normal (-1 to 2mm) PCL-Posterior Drawer: normal (0 to 2mm)     MCL - Valgus: normal (0 to 2mm)  LCL - Varus: normalPivot Shift: normal (Equal)Reverse Pivot Shift: normal (Equal)Dial Test at 30 degrees: normal (< 5 degrees)Dial Test at 90 degrees: normal (< 5 degrees)  Posterior Sag Test: negative  Posterolateral Corner: unstable (>15 degrees difference)  Patella   Patellar apprehension: negative  Passive Patellar Tilt: neutral  Patellar Tracking: normal  Patellar Glide (quadrants): Lateral - 1   Medial - 2  Q-Angle at 90 degrees: normal  Patellar Grind: negative  J-Sign: none    Other   Meniscal Cyst: absent  Popliteal (Baker's) Cyst: absent  Sensation: normal    Left Knee Exam     Inspection   Erythema: absent  Scars: present  Swelling: present  Effusion: present  Deformity: absent  Bruising: absent    Tenderness   The patient tender to palpation of the medial joint line, patella, patellar tendon, lateral joint line and lateral retinaculum.    Crepitus   The patient has crepitus of the patella.    Range of Motion   Extension: 0   Flexion: 100     Tests   Meniscus   Kev:  Medial - positive Lateral - positive  Stability Lachman: normal (-1 to 2mm) PCL-Posterior Drawer: normal (0 to 2mm)  MCL - Valgus: normal (0 to 2mm)  LCL - Varus: normal (0 to 2mm)Pivot Shift: normal (Equal)Reverse Pivot Shift: normal (Equal)Dial Test at 30 degrees: normal (< 5 degrees)Dial Test at 90 degrees: normal (< 5  degrees)  Posterior Sag Test: negative  Posterolateral Corner: unstable (>15 degrees difference)  Patella   Patellar apprehension: negative  Passive Patellar Tilt: neutral  Patellar Tracking: normal  Patellar Glide (Quadrants): Lateral - 1 Medial - 2  Q-Angle at 90 degrees: normal  Patellar Grind: positive  J-Sign: J sign absent    Other   Meniscal Cyst: absent  Popliteal (Baker's) Cyst: absent  Sensation: normal    Right Hip Exam     Tests   Shadia: negative  Left Hip Exam     Tests   Shadia: positive          Muscle Strength   Right Lower Extremity   Anterior tibial:  /5  Posterior tibial:    Gastrocsoleus:    Peroneal muscle:    EHL:    FDL:   EDL:   FHL: 5/5  Left Lower Extremity   Hip Abduction: 5   Quadriceps:  5   Hamstrin/5   Anterior tibial:     Posterior tibial:    Gastrocsoleus:    Peroneal muscle:    EHL:    FDL:   EDL:   FHL: 5/    Reflexes     Left Side  Quadriceps:  2+  Post Tibial:  2+  Achilles:  2+  Plantar Reflex: 2+    Right Side   Quadriceps:  2+  Post Tibial:  2+  Achilles:  2+  Plantar Reflex: 2+  Right Babinski's sign: ttp 3-5 phalanges.    Vascular Exam     Right Pulses  Dorsalis Pedis:      2+  Posterior Tibial:      2+        Left Pulses  Dorsalis Pedis:      2+  Posterior Tibial:      2+        Edema  Right Lower Leg: absent  Left Lower Leg: absent      MRI of the knee without contrast.    17 10:06:17    Accession# [id number]    CLINICAL INDICATION: 42 year old F with sprain.    TECHNIQUE: Multiplanar multisequence MR imaging of the left knee without the use of intravenous contrast.    COMPARISON:  No priors    FINDINGS:     Menisci:  Obliquely oriented tear extending to the intra-articular surface involving the junction of the body and posterior horn of the lateral meniscus. There is also degenerative free edge fraying. Complex degenerative tear involving the body and posterior horn of the medial meniscus, which extends to  the intra-articular surface. There is also degenerative free edge fraying and mild medial subluxation.     Ligaments:  ACL, PCL, MCL, and LCL are intact.    Tendons:  Extensor mechanism is maintained.    Cartilage:   Patellofemoral: High-grade cartilaginous irregularity with full-thickness defect along the medial aspect of the trochlea and medial patellar facet.  Medial tibiofemoral: Cartilaginous thinning and irregularity without full-thickness defect or subchondral edema.  Lateral tibiofemoral: Cartilaginous thinning and irregularity without full-thickness defect or subchondral edema.    Bone: No fracture or marrow replacing process. Mild tricompartmental osteophytosis.    Miscellaneous: Trace joint effusion.      Impression         Tears of the medial and lateral menisci.    Tricompartmental osteoarthritis as above.             Assessment:       Encounter Diagnoses   Name Primary?    Left knee pain, unspecified chronicity Yes    Toe pain, left     Decreased ROM of left knee     Complex tear of lateral meniscus of left knee as current injury, initial encounter     Chondromalacia, patella, left     Chondromalacia, knee, left     Obesity, unspecified classification, unspecified obesity type, unspecified whether serious comorbidity present           Plan:         IKDC, SF-12 and KOOS was filled out today in clinic.     RTC in 6 weeks with Dr. Christopher Davis Patient will fill out IKDC, SF-12 and KOOS on return.    2. Referral to Renuka Gamboa to rheumatology to establish care    3. Referral to nutrition for weight loss    4. Continue physical therapy at [facility]    5. We did discuss that she will likely need TKA given the amount of wear and cartilage los in her knee. We will hold off on this at this point until further weigh loss and physical therapy.    6. Refill on meloxicam given.                    [facility] patient questionnaires have been collected today.

## 2018-11-14 NOTE — LETTER
November 14, 2018      Brandon Hernandez Jr., MD  [address]  [address]  [place] [address]           [address]  [facility] [address]  Phone: [phone]          Patient: Morena Das   MR Number: [id number]   YOB: 1975   Date of Visit: 11/14/2018       Dear Dr. Brandon Hernandez Jr.:    Thank you for referring Morena Das to me for evaluation. Attached you will find relevant portions of my assessment and plan of care.    If you have questions, please do not hesitate to call me. I look forward to following Morena Das along with you.    Sincerely,    Christopher Davis MD    Enclosure  CC:  No Recipients    If you would like to receive this communication electronically, please contact [email or web address] or [phone] to request more information on [email or web address] access.    For providers and/or their staff who would like to refer a patient to [facility], please contact us through our one-stop-shop provider referral line, [facility], at [phone].    If you feel you have received this communication in error or would no longer like to receive these types of communications, please e-mail [email or web address]

## 2018-12-31 ENCOUNTER — TELEPHONE (OUTPATIENT)
Dept: SPORTS MEDICINE | Facility: CLINIC | Age: 43
End: 2018-12-31

## 2019-01-08 ENCOUNTER — TELEPHONE (OUTPATIENT)
Dept: SPORTS MEDICINE | Facility: CLINIC | Age: 44
End: 2019-01-08

## 2019-01-08 NOTE — TELEPHONE ENCOUNTER
unable to LVM mailbox full.    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

## 2019-01-08 NOTE — TELEPHONE ENCOUNTER
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

## 2019-01-10 ENCOUNTER — OFFICE VISIT (OUTPATIENT)
Dept: SPORTS MEDICINE | Facility: CLINIC | Age: 44
End: 2019-01-10
Payer: MEDICARE

## 2019-01-10 ENCOUNTER — HOSPITAL ENCOUNTER (OUTPATIENT)
Dept: RADIOLOGY | Facility: HOSPITAL | Age: 44
Discharge: HOME | End: 2019-01-10
Attending: PHYSICIAN ASSISTANT
Payer: MEDICARE

## 2019-01-10 VITALS
DIASTOLIC BLOOD PRESSURE: 95 MMHG | SYSTOLIC BLOOD PRESSURE: 154 MMHG | WEIGHT: 274 LBS | BODY MASS INDEX: 38.36 KG/M2 | HEIGHT: 71 IN | HEART RATE: 96 BPM

## 2019-01-10 DIAGNOSIS — M25.562 LEFT KNEE PAIN, UNSPECIFIED CHRONICITY: ICD-10-CM

## 2019-01-10 DIAGNOSIS — M25.562 LEFT KNEE PAIN, UNSPECIFIED CHRONICITY: Primary | ICD-10-CM

## 2019-01-10 DIAGNOSIS — M17.12 PRIMARY OSTEOARTHRITIS OF LEFT KNEE: ICD-10-CM

## 2019-01-10 DIAGNOSIS — Z98.890 S/P LEFT KNEE ARTHROSCOPY: ICD-10-CM

## 2019-01-10 PROCEDURE — 99999 PR PBB SHADOW E&M-EST. PATIENT-LVL IV: ICD-10-PCS | Mod: PBBFAC,,, | Performed by: PHYSICIAN ASSISTANT

## 2019-01-10 PROCEDURE — 99999 PR PBB SHADOW E&M-EST. PATIENT-LVL IV: CPT | Mod: PBBFAC,,, | Performed by: PHYSICIAN ASSISTANT

## 2019-01-10 PROCEDURE — 99214 OFFICE O/P EST MOD 30 MIN: CPT | Mod: PBBFAC,PO | Performed by: PHYSICIAN ASSISTANT

## 2019-01-10 PROCEDURE — 99213 PR OFFICE/OUTPT VISIT, EST, LEVL III, 20-29 MIN: ICD-10-PCS | Mod: S$PBB,,, | Performed by: PHYSICIAN ASSISTANT

## 2019-01-10 PROCEDURE — 99213 OFFICE O/P EST LOW 20 MIN: CPT | Mod: S$PBB,,, | Performed by: PHYSICIAN ASSISTANT

## 2019-01-10 NOTE — PROGRESS NOTES
Subjective:          Chief Complaint: Morena Das is a 43 y.o. female who had concerns including Pain of the Left Knee.    42 yo F here today for follow up left knee pain.  Had a L knee meniscal tear last year after falling and twisting her knee in September 2017. She tried injections and therapy which did not help. She had a knee arthroscopy last month by Dr. Hernandez in October 3rd, 2018 for menisectomy. She did have HTN post op requirng admission overnight for observation. She has not had any relief from surgery. She is no longer ambulating with crutches. She was taking xanaflex for muscle spasms, meloxicam, and oxycodone for pain. She is out of meloxicam and oxycodone. She is doing aquatic therapy at [facility], but she has not been in 3 weeks. She was doing well with the aquatic therapy and needs a new referral. Pain is 7/10. She has not seen rheumatology or nutrition services because no one called her for an appointment. She states she was told by Dr. Hernandez that she would likely need a knee replacement. She is working on weight loss and states that she has lost 10 lbs.       Per patient and mom, she states she has an undiagnosed autoimmune disease. She is followed by her PCP Dr. Nellie Stafford at [facility]. She is not on any medications other than meloxicam for this.    Surgery Date: 10/3/2018      Surgeon(s) and Role:     * Brandon Hernandez Jr., MD - Primary     Assisting Surgeon: None     Assistants: Taye Ayon PA-C     Pre-op Diagnosis:  Peripheral tear of medial meniscus of left knee as current injury, initial encounter (S83.222A)  Peripheral tear of lateral meniscus of left knee as current injury, initial encounter (S83.262A)     Post-op Diagnosis:  Post-Op Diagnosis Codes:     * Peripheral tear of medial meniscus of left knee as current injury, initial encounter (S83.222A)     * Peripheral tear of lateral meniscus of left knee as current injury, initial encounter (S83.262A)     Procedure(s)  (LRB):  ARTHROSCOPY, KNEE - ARTHROSCOPY LATERAL AND MEDIAL MENISCECTOMY (Left)  CHONDROPLASTY, KNEE (Left)    Findings: 1.  Left knee medial femoral condyle, grade IV lesion with loose flap.  2.  Left knee trochlear lesion grade IV with loose flaps, extensive.  3.  Lateral meniscus tear.  4.  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        Pain   Associated symptoms include joint swelling. Pertinent negatives include no chest pain, chills, coughing, fever, headaches, nausea, rash or vomiting.       Review of Systems   Constitution: Negative for chills and fever.   HENT: Negative for ear pain and tinnitus.    Eyes: Negative for photophobia and visual disturbance.   Cardiovascular: Negative for chest pain and claudication.   Respiratory: Negative for cough and shortness of breath.    Endocrine: Negative for cold intolerance and heat intolerance.   Hematologic/Lymphatic: Negative for adenopathy. Does not bruise/bleed easily.   Skin: Negative for poor wound healing and rash.   Musculoskeletal: Positive for joint pain, joint swelling and stiffness. Negative for back pain.   Gastrointestinal: Negative for nausea and vomiting.   Genitourinary: Negative for flank pain and hematuria.   Neurological: Negative for dizziness and headaches.   Psychiatric/Behavioral: Negative for altered mental status. The patient is not nervous/anxious.        Pain Related Questions  Over the past 3 days, what was your average pain during activity? (I.e. running, jogging, walking, climbing stairs, getting dressed, ect.): 9  Over the past 3 days, what was your highest pain level?: 9  Over the past 3 days, what was your lowest pain level? : 7    Other  How many nights a week are you awakened by your affected body part?: 3  Was the patient's HEIGHT measured or patient reported?: Patient Reported  Was the patient's WEIGHT measured or patient reported?: Measured      Objective:        General: [facility] is well-developed, well-nourished,  appears stated age, in no acute distress, alert and oriented to time, place and person.     General    Vitals reviewed.  Constitutional: She is oriented to person, place, and time. She appears well-developed and well-nourished. No distress.   HENT:   Mouth/Throat: No oropharyngeal exudate.   Eyes: Right eye exhibits no discharge. Left eye exhibits no discharge.   Neck: Normal range of motion.   Cardiovascular: Normal rate.    Pulmonary/Chest: Effort normal and breath sounds normal. No respiratory distress.   Neurological: She is alert and oriented to person, place, and time. She has normal reflexes. No cranial nerve deficit. Coordination normal.   Psychiatric: She has a normal mood and affect. Her behavior is normal. Judgment and thought content normal.     General Musculoskeletal Exam   Gait: normal     Right Ankle/Foot Exam     Inspection   Scars: absent  Deformity: absent  Erythema: absent  Bruising: Ankle - absent Foot - present  Effusion: Ankle - absent Foot - absent  Atrophy: Ankle - absent Foot - absent    Range of Motion   Ankle Joint   Dorsiflexion:  10 normal   Plantar flexion:  35 normal   Subtalar Joint   Inversion:  15 normal   Eversion:  5 normal   Salmon Test:  negative  First MTP Joint: normal    Alignment   Knee Alignment: neutral  Hindfoot Alignment: neutral  Midfoot Alignment: normal  Forefoot Alignment: normal    Tests   Anterior drawer: 1+  Varus tilt: 1+  Heel Walk: able to perform  Tiptoe Walk: able to perform  Single Heel Rise: able to perform  External Rotation Test: negative  Squeeze Test: negative    Other   Ankle Crepitus: absent  Foot Crepitus:  absent  Sensation: normal  Peroneal Subluxation: negative    Comments:  Ttp 3-5th phalanges     Left Ankle/Foot Exam     Inspection  Deformity: absent  Erythema: absent  Bruising: Ankle - absent Foot - absent  Effusion: Ankle - absent Foot - absent  Atrophy: Ankle - absent Foot - absent  Scars: absent    Range of Motion   Ankle  Joint  Dorsiflexion:  10 normal   Plantar flexion:  35 normal     Subtalar Joint   Inversion:  15 normal   Eversion:  5 normal   Salmon Test:  normal  First MTP Joint: normal    Alignment   Knee Alignment: neutral  Hindfoot Alignment: neutral  Midfoot Alignment: normal  Forefoot Alignment: normal    Tests   Anterior drawer: 1+  Varus tilt: 1+  Heel Walk: able to perform  Tiptoe Walk: able to perform  Single Heel Rise: able to perform  External Rotation Test: negative  Squeeze Test: absent    Other   Foot Crepitus:  absent  Ankle Crepitus: absent  Sensation: normal  Peroneal Subluxation: negative    Right Knee Exam     Inspection   Erythema: absent  Scars: absent  Swelling: absent  Effusion: absent  Deformity: absent  Bruising: absent    Tenderness   The patient is experiencing no tenderness.     Range of Motion   Extension:  0 normal   Flexion:  140 normal     Tests   Meniscus   Kev:  Medial - negative Lateral - negative  Ligament Examination Lachman: normal (-1 to 2mm) PCL-Posterior Drawer: normal (0 to 2mm)     MCL - Valgus: normal (0 to 2mm)  LCL - Varus: normalPivot Shift: normal (Equal)Reverse Pivot Shift: normal (Equal)  Posterior Sag Test: negative  Posterolateral Corner: unstable (>15 degrees difference)  Patella   Patellar apprehension: negative  Passive Patellar Tilt: neutral  Patellar Tracking: normal  Patellar Glide (quadrants): Lateral - 1   Medial - 2  Q-Angle at 90 degrees: normal  Patellar Grind: negative  J-Sign: none    Other   Meniscal Cyst: absent  Popliteal (Baker's) Cyst: absent  Sensation: normal    Left Knee Exam     Inspection   Erythema: absent  Scars: present  Swelling: present  Effusion: present  Deformity: absent  Bruising: absent    Tenderness   The patient tender to palpation of the medial joint line, patella, patellar tendon, lateral joint line and lateral retinaculum.    Crepitus   The patient has crepitus of the patella.    Range of Motion   Extension:  0 normal   Flexion:   120 abnormal     Tests   Meniscus   Kev:  Medial - positive Lateral - positive  Stability Lachman: normal (-1 to 2mm) PCL-Posterior Drawer: normal (0 to 2mm)  MCL - Valgus: normal (0 to 2mm)  LCL - Varus: normal (0 to 2mm)Pivot Shift: normal (Equal)Reverse Pivot Shift: normal (Equal)  Posterior Sag Test: negative  Posterolateral Corner: unstable (>15 degrees difference)  Patella   Patellar apprehension: negative  Passive Patellar Tilt: neutral  Patellar Tracking: normal  Patellar Glide (Quadrants): Lateral - 1 Medial - 2  Q-Angle at 90 degrees: normal  Patellar Grind: positive  J-Sign: J sign absent    Other   Meniscal Cyst: absent  Popliteal (Baker's) Cyst: absent  Sensation: normal    Right Hip Exam     Tests   Shadia: negative  Left Hip Exam     Tests   Shadia: positive          Muscle Strength   Right Lower Extremity   Anterior tibial:  5/5/5  Posterior tibial:  5/5/5  Gastrocsoleus:  5/5/5  Peroneal muscle:  5/5/5  EHL:  5/5  FDL: 5/5  EDL: 5/5  FHL: 5/5  Left Lower Extremity   Hip Abduction: 4/5   Quadriceps:  4/5   Hamstrin/5   Anterior tibial:  5/5/5   Posterior tibial:  5/5/5  Gastrocsoleus:  5/5/5  Peroneal muscle:  5/5/5  EHL:  5/5  FDL: 5/5  EDL: 5/5  FHL: 5/5    Reflexes     Left Side  Quadriceps:  2+  Post Tibial:  2+  Achilles:  2+  Plantar Reflex: 2+    Right Side   Quadriceps:  2+  Post Tibial:  2+  Achilles:  2+  Plantar Reflex: 2+  Right Babinski's sign: ttp 3-5 phalanges.    Vascular Exam     Right Pulses  Dorsalis Pedis:      2+  Posterior Tibial:      2+        Left Pulses  Dorsalis Pedis:      2+  Posterior Tibial:      2+        Edema  Right Lower Leg: absent  Left Lower Leg: absent      MRI of the knee without contrast.    17 10:06:17    Accession# [id number]    CLINICAL INDICATION: 42 year old F with sprain.    TECHNIQUE: Multiplanar multisequence MR imaging of the left knee without the use of intravenous contrast.    COMPARISON:  No priors    FINDINGS:     Menisci:  Obliquely  oriented tear extending to the intra-articular surface involving the junction of the body and posterior horn of the lateral meniscus. There is also degenerative free edge fraying. Complex degenerative tear involving the body and posterior horn of the medial meniscus, which extends to the intra-articular surface. There is also degenerative free edge fraying and mild medial subluxation.     Ligaments:  ACL, PCL, MCL, and LCL are intact.    Tendons:  Extensor mechanism is maintained.    Cartilage:   Patellofemoral: High-grade cartilaginous irregularity with full-thickness defect along the medial aspect of the trochlea and medial patellar facet.  Medial tibiofemoral: Cartilaginous thinning and irregularity without full-thickness defect or subchondral edema.  Lateral tibiofemoral: Cartilaginous thinning and irregularity without full-thickness defect or subchondral edema.    Bone: No fracture or marrow replacing process. Mild tricompartmental osteophytosis.    Miscellaneous: Trace joint effusion.      Impression         Tears of the medial and lateral menisci.    Tricompartmental osteoarthritis as above.     RADIOGRAPHS:  Bilateral knees:  FINDINGS:  Tibiofemoral joint space narrowing is observed bilaterally, preferentially involving the lateral tibiofemoral compartment on each side, with some very minimal tibiofemoral spurring seen bilaterally.  There is some lateral compartment patellofemoral joint space narrowing observed on both sides, with minimal patellofemoral spurring also seen bilaterally.  The merchant's projection demonstrates some slight lateral patellar subluxation on both sides.  Osseous structures demonstrate no evidence of recent or healing fracture, lytic destructive process, or other significant abnormality.  Soft tissue fullness in the suprapatellar bursa on the left side is seen, consistent with a joint effusion, with no joint effusion observed on the right.          Assessment:       Encounter  Diagnoses   Name Primary?    Left knee pain, unspecified chronicity Yes    Primary osteoarthritis of left knee     S/P left knee arthroscopy           Plan:         1. IKDC, SF-12 and KOOS was not filled out today in clinic.     RTC in 3 months with Dr. Christopher Davis for follow up and possible scheduling of TKA. Patient will fill out IKDC, SF-12 and KOOS on return.    2. Referral to Ren[facility] Gamboa to rheumatology to establish care- Phone number provided    3. Referral to nutrition for weight loss- number provided    4. Continue physical therapy at [facility] placed    5. We did discuss that she will likely need TKA given the amount of wear and cartilage los in her knee. We will hold off on this at this point until further weigh loss and physical therapy.    6. Mobic prn pain                    [facility] patient questionnaires have been collected today.

## 2019-04-26 ENCOUNTER — HOSPITAL ENCOUNTER (EMERGENCY)
Facility: OTHER | Age: 44
Discharge: HOME | End: 2019-04-26
Attending: EMERGENCY MEDICINE
Payer: MEDICARE

## 2019-04-26 VITALS
HEART RATE: 78 BPM | TEMPERATURE: 98 F | RESPIRATION RATE: 16 BRPM | WEIGHT: 265 LBS | HEIGHT: 71 IN | BODY MASS INDEX: 37.1 KG/M2 | SYSTOLIC BLOOD PRESSURE: 129 MMHG | OXYGEN SATURATION: 98 % | DIASTOLIC BLOOD PRESSURE: 72 MMHG

## 2019-04-26 DIAGNOSIS — R51.9 ACUTE NONINTRACTABLE HEADACHE, UNSPECIFIED HEADACHE TYPE: ICD-10-CM

## 2019-04-26 DIAGNOSIS — R93.89 ABNORMAL CHEST X-RAY: ICD-10-CM

## 2019-04-26 DIAGNOSIS — I10 HTN (HYPERTENSION): Primary | ICD-10-CM

## 2019-04-26 LAB
ALBUMIN SERPL BCP-MCNC: 3.4 G/DL (ref 3.5–5.2)
ALP SERPL-CCNC: 100 U/L (ref 55–135)
ALT SERPL W/O P-5'-P-CCNC: 17 U/L (ref 10–44)
ANION GAP SERPL CALC-SCNC: 12 MMOL/L (ref 8–16)
AST SERPL-CCNC: 16 U/L (ref 10–40)
BASOPHILS # BLD AUTO: 0.03 K/UL (ref 0–0.2)
BASOPHILS NFR BLD: 0.2 % (ref 0–1.9)
BILIRUB SERPL-MCNC: 0.7 MG/DL (ref 0.1–1)
BILIRUB UR QL STRIP: NEGATIVE
BUN SERPL-MCNC: 9 MG/DL (ref 6–20)
CALCIUM SERPL-MCNC: 9.6 MG/DL (ref 8.7–10.5)
CHLORIDE SERPL-SCNC: 106 MMOL/L (ref 95–110)
CLARITY UR: CLEAR
CO2 SERPL-SCNC: 22 MMOL/L (ref 23–29)
COLOR UR: YELLOW
CREAT SERPL-MCNC: 0.8 MG/DL (ref 0.5–1.4)
DIFFERENTIAL METHOD: ABNORMAL
EOSINOPHIL # BLD AUTO: 0.3 K/UL (ref 0–0.5)
EOSINOPHIL NFR BLD: 2.3 % (ref 0–8)
ERYTHROCYTE [DISTWIDTH] IN BLOOD BY AUTOMATED COUNT: 13.8 % (ref 11.5–14.5)
EST. GFR  (AFRICAN AMERICAN): >60 ML/MIN/1.73 M^2
EST. GFR  (NON AFRICAN AMERICAN): >60 ML/MIN/1.73 M^2
GLUCOSE SERPL-MCNC: 87 MG/DL (ref 70–110)
GLUCOSE UR QL STRIP: NEGATIVE
HCT VFR BLD AUTO: 41.3 % (ref 37–48.5)
HGB BLD-MCNC: 13.6 G/DL (ref 12–16)
HGB UR QL STRIP: ABNORMAL
KETONES UR QL STRIP: NEGATIVE
LEUKOCYTE ESTERASE UR QL STRIP: NEGATIVE
LYMPHOCYTES # BLD AUTO: 3.4 K/UL (ref 1–4.8)
LYMPHOCYTES NFR BLD: 26.7 % (ref 18–48)
MCH RBC QN AUTO: 28 PG (ref 27–31)
MCHC RBC AUTO-ENTMCNC: 32.9 G/DL (ref 32–36)
MCV RBC AUTO: 85 FL (ref 82–98)
MONOCYTES # BLD AUTO: 0.6 K/UL (ref 0.3–1)
MONOCYTES NFR BLD: 4.7 % (ref 4–15)
NEUTROPHILS # BLD AUTO: 8.5 K/UL (ref 1.8–7.7)
NEUTROPHILS NFR BLD: 65.9 % (ref 38–73)
NITRITE UR QL STRIP: NEGATIVE
PH UR STRIP: 5 [PH] (ref 5–8)
PLATELET # BLD AUTO: 266 K/UL (ref 150–350)
PMV BLD AUTO: 10.5 FL (ref 9.2–12.9)
POTASSIUM SERPL-SCNC: 3.7 MMOL/L (ref 3.5–5.1)
PROT SERPL-MCNC: 8.5 G/DL (ref 6–8.4)
PROT UR QL STRIP: NEGATIVE
RBC # BLD AUTO: 4.86 M/UL (ref 4–5.4)
SODIUM SERPL-SCNC: 140 MMOL/L (ref 136–145)
SP GR UR STRIP: <=1.005 (ref 1–1.03)
URN SPEC COLLECT METH UR: ABNORMAL
UROBILINOGEN UR STRIP-ACNC: NEGATIVE EU/DL
WBC # BLD AUTO: 12.82 K/UL (ref 3.9–12.7)

## 2019-04-26 PROCEDURE — 63600175 PHARM REV CODE 636 W HCPCS: Performed by: PHYSICIAN ASSISTANT

## 2019-04-26 PROCEDURE — 93010 ELECTROCARDIOGRAM REPORT: CPT | Mod: ,,, | Performed by: INTERNAL MEDICINE

## 2019-04-26 PROCEDURE — 96361 HYDRATE IV INFUSION ADD-ON: CPT

## 2019-04-26 PROCEDURE — 81003 URINALYSIS AUTO W/O SCOPE: CPT

## 2019-04-26 PROCEDURE — 93010 EKG 12-LEAD: ICD-10-PCS | Mod: ,,, | Performed by: INTERNAL MEDICINE

## 2019-04-26 PROCEDURE — 80053 COMPREHEN METABOLIC PANEL: CPT

## 2019-04-26 PROCEDURE — 99285 EMERGENCY DEPT VISIT HI MDM: CPT | Mod: 25

## 2019-04-26 PROCEDURE — 93005 ELECTROCARDIOGRAM TRACING: CPT

## 2019-04-26 PROCEDURE — 85025 COMPLETE CBC W/AUTO DIFF WBC: CPT

## 2019-04-26 PROCEDURE — 96374 THER/PROPH/DIAG INJ IV PUSH: CPT

## 2019-04-26 PROCEDURE — 96375 TX/PRO/DX INJ NEW DRUG ADDON: CPT

## 2019-04-26 PROCEDURE — 25000003 PHARM REV CODE 250: Performed by: PHYSICIAN ASSISTANT

## 2019-04-26 RX ORDER — METOCLOPRAMIDE HYDROCHLORIDE 5 MG/ML
10 INJECTION INTRAMUSCULAR; INTRAVENOUS
Status: COMPLETED | OUTPATIENT
Start: 2019-04-26 | End: 2019-04-26

## 2019-04-26 RX ORDER — KETOROLAC TROMETHAMINE 30 MG/ML
10 INJECTION, SOLUTION INTRAMUSCULAR; INTRAVENOUS
Status: COMPLETED | OUTPATIENT
Start: 2019-04-26 | End: 2019-04-26

## 2019-04-26 RX ORDER — DIPHENHYDRAMINE HYDROCHLORIDE 50 MG/ML
12.5 INJECTION INTRAMUSCULAR; INTRAVENOUS
Status: COMPLETED | OUTPATIENT
Start: 2019-04-26 | End: 2019-04-26

## 2019-04-26 RX ADMIN — METOCLOPRAMIDE 10 MG: 5 INJECTION, SOLUTION INTRAMUSCULAR; INTRAVENOUS at 07:04

## 2019-04-26 RX ADMIN — KETOROLAC TROMETHAMINE 10 MG: 30 INJECTION, SOLUTION INTRAMUSCULAR at 07:04

## 2019-04-26 RX ADMIN — SODIUM CHLORIDE 1000 ML: 0.9 INJECTION, SOLUTION INTRAVENOUS at 07:04

## 2019-04-26 RX ADMIN — DIPHENHYDRAMINE HYDROCHLORIDE 12.5 MG: 50 INJECTION, SOLUTION INTRAMUSCULAR; INTRAVENOUS at 07:04

## 2019-04-26 NOTE — ED TRIAGE NOTES
Pt reports a headache since Wednesday. She took tylenol with no relief. She went to  today and they had her take her B/P med and then sent her home telling her to rest for three hours. Pt reports she layed down and called them back and was instructed to report to ED for B/P 177/110. Pt is compliant with meds

## 2019-04-26 NOTE — ED NOTES
LOC: The patient is awake, alert and aware of environment with an appropriate affect, the patient is oriented x 3 and speaking appropriately.    APPEARANCE: Patient resting comfortably and in no acute distress, patient is clean and well groomed, patient's clothing properly fastened.    SKIN: The skin is warm and dry, color consistent with ethnicity, patient has normal skin turgor and moist mucus membranes, skin intact, no breakdown or brusing noted.    MUSKULOSKELETAL: Patient moving all extremities well, no obvious swelling or deformities noted.    RESPIRATORY: Airway is open and patent, respirations are spontaneous, patient has a normal effort and rate, no accessory muscle use noted.    CARDIAC: Patient has a normal rate and rhythm, no peripheral edema noted, capillary refill < 3 seconds.    ABDOMEN: Soft and non tender to palpation, no distention noted.    NEUROLOGIC: PERRL, 3mm bilaterally, eyes open spontaneously, behavior appropriate to situation, follows commands, facial expression symmetrical, bilateral hand grasp equal and even, purposeful motor response noted, normal sensation in all extremities when touched with a finger.

## 2019-04-26 NOTE — ED PROVIDER NOTES
Encounter Date: 2019       History     Chief Complaint   Patient presents with    Hypertension     Headache and hypertension.  CVA in 2016.  Went to urgent care and they had her take 50mg of HCTZ and 80mg Verapamil today, laid down, took a nap and BP is still increasing.  States she is compliant with meds.      Patient is a 43 y.o. female with a past medical history of hypertension, presenting to the emergency room with complaints of elevated blood pressure, headache. Patient states that her symptoms started 3 days ago.  She states her headache is located on the left side.  She states that today she checked her blood pressure noted it was elevated around 180.  She states that she went to urgent care and they recommended she take a 2nd dose of her hydrochlorothiazide.  She states that she was told to take a nap, and to recheck her blood pressure in 3 hr.  She states that when she recheck, it was still elevated so she was advised to come to the emergency room.  She admits that she had a right-sided stroke in 2016, no residual deficits.  She denies any chest pain or shortness of breath. She states that she takes her medication regularly, hydrochlorothiazide in the morning as well as Verapimil twice per day.  No numbness, tingling, weakness of upper lower extremities bilaterally.This is the extent of the patient's complaints at this time.       The history is provided by the patient.     Review of patient's allergies indicates:   Allergen Reactions    Crayfish Swelling     hives    Morphine Swelling     hives    Tramadol Itching and Rash     Past Medical History:   Diagnosis Date    Arthritis     osteoarthritis    Hypertension     Immune disorder     Knee injury     Migraine     Tachycardia      Past Surgical History:   Procedure Laterality Date    ARTHROSCOPY, KNEE - ARTHROSCOPY LATERAL AND MEDIAL MENISCECTOMY Left 10/3/2018    Performed by [facility] Jr[facility] at [facility] OR     SECTION       CHONDROPLASTY, KNEE Left 10/3/2018    Performed by [facility] at [facility] OR    HYSTERECTOMY      lymph nodes      lymph nodes removed      History reviewed. No pertinent family history.  Social History     Tobacco Use    Smoking status: Former Smoker   Substance Use Topics    Alcohol use: Yes     Alcohol/week: 4.2 oz     Types: 7 Glasses of wine per week    Drug use: Not on file     Review of Systems   Constitutional: Negative for activity change, appetite change, chills, fatigue and fever.   HENT: Negative for congestion, rhinorrhea and sore throat.    Eyes: Negative for photophobia and visual disturbance.   Respiratory: Negative for cough, shortness of breath and wheezing.    Cardiovascular: Negative for chest pain.   Gastrointestinal: Negative for abdominal pain, diarrhea, nausea and vomiting.   Genitourinary: Negative for dysuria, hematuria and urgency.   Musculoskeletal: Negative for back pain, myalgias and neck pain.   Skin: Negative for color change and wound.   Neurological: Positive for headaches. Negative for weakness.   Psychiatric/Behavioral: Negative for agitation and confusion.       Physical Exam     Initial Vitals [04/26/19 1618]   BP Pulse Resp Temp SpO2   (!) 184/128 (!) 113 18 98.4 °F (36.9 °C) 98 %      MAP       --         Physical Exam    Nursing note and vitals reviewed.  Constitutional: She appears well-developed and well-nourished. She is not diaphoretic. She is cooperative.  Non-toxic appearance. She does not have a sickly appearance. No distress.   Well-appearing,  female accompanied by her cousin in the emergency room.  Speaking clearly full sentences.  No acute distress.   HENT:   Head: Normocephalic and atraumatic.   Right Ear: External ear normal.   Left Ear: External ear normal.   Nose: Nose normal.   Mouth/Throat: Oropharynx is clear and moist.   Eyes: Conjunctivae and EOM are normal.   Neck: Normal range of motion. Neck supple.   Cardiovascular:  Regular rhythm and normal heart sounds. Tachycardia present.    Pulmonary/Chest: Breath sounds normal. No respiratory distress. She has no wheezes.   Musculoskeletal: Normal range of motion.   Neurological: She is alert and oriented to person, place, and time. She has normal strength. No cranial nerve deficit or sensory deficit. GCS eye subscore is 4. GCS verbal subscore is 5. GCS motor subscore is 6.   AAOx4. CN II-XII were intact. Good finger-to-nose task ability.  Negative pronator drift. Normal gait, good heel to toe.   Skin: Skin is warm.   Psychiatric: She has a normal mood and affect. Her behavior is normal. Judgment and thought content normal.         ED Course   Procedures  Labs Reviewed   CBC W/ AUTO DIFFERENTIAL - Abnormal; Notable for the following components:       Result Value    WBC 12.82 (*)     Gran # (ANC) 8.5 (*)     All other components within normal limits   COMPREHENSIVE METABOLIC PANEL - Abnormal; Notable for the following components:    CO2 22 (*)     Total Protein 8.5 (*)     Albumin 3.4 (*)     All other components within normal limits   URINALYSIS, REFLEX TO URINE CULTURE - Abnormal; Notable for the following components:    Specific Gravity, UA <=1.005 (*)     Occult Blood UA Trace (*)     All other components within normal limits    Narrative:     Preferred Collection Type->Urine, Clean Catch     EKG Readings: (Independently Interpreted)   Initial Reading: No STEMI. Rhythm: Normal Sinus Rhythm. Heart Rate: 79.       Imaging Results           X-Ray Chest PA And Lateral (Final result)  Result time 04/26/19 18:22:34    Final result by Jj Preciado MD (04/26/19 18:22:34)                 Impression:      No radiographic acute intrathoracic process seen.    Few additional findings as above.    This report was flagged in Epic as containing an incidental finding.      Electronically signed by: Jj Preciado MD  Date:    04/26/2019  Time:    18:22             Narrative:    EXAMINATION:  XR CHEST  PA AND LATERAL    CLINICAL HISTORY:  Essential (primary) hypertension    TECHNIQUE:  PA and lateral views of the chest were performed.    COMPARISON:  None    FINDINGS:  The lungs are clear, with normal appearance of pulmonary vasculature and no pleural effusion or pneumothorax.    The cardiac silhouette is normal in size. The hilar and mediastinal contours are unremarkable.    Osseous structures show age-related mild degenerative change without acute or destructive process seen.  There is a 5.2 x 4.6 cm rim calcification projected over the left upper quadrant, which could reflect sequela of partially calcified cyst or remote trauma/hematoma within the spleen, with partially calcified very large splenic arterial aneurysm not entirely excluded but considered less likely.  Correlate clinically and with any prior imaging from outside facility if/when available.                               CT Head Without Contrast (Final result)  Result time 04/26/19 18:21:39    Final result by Rossi Bland MD (04/26/19 18:21:39)                 Impression:      No acute intracranial abnormalities identified.      Electronically signed by: Rossi Bland MD  Date:    04/26/2019  Time:    18:21             Narrative:    EXAMINATION:  CT HEAD WITHOUT CONTRAST    CLINICAL HISTORY:  Headache, acute, norm neuro exam;    TECHNIQUE:  Low dose axial images were obtained through the head.  Coronal and sagittal reformations were also performed. Contrast was not administered.    COMPARISON:  None.    FINDINGS:  The brain is normally formed and exhibits normal density throughout with no indication of acute/recent major vascular distribution cerebral infarction, intraparenchymal hemorrhage, or intra-axial space occupying lesion. The ventricular system is normal in size and configuration with no evidence of hydrocephalus. No effacement of the skull-base cisterns. No abnormal extra-axial fluid collections or blood products. Visualized paranasal  sinuses and mastoid air cells are clear. The calvarium shows no significant abnormality.                                 Medical Decision Making:   Initial Assessment:   Urgent evaluation of a 43-year-old female with a past medical history of hypertension, presenting to the emergency room with complaints of headache and high blood pressure.  Patient is afebrile, nontoxic appearing, hemodynamically stable. Physical exam reveals tachycardia, lungs are clear to auscultation bilaterally. No focal neurological deficits or weaknesses.  Will plan for workup including CT head, EKG, labs and reassess.  Independently Interpreted Test(s):   I have ordered and independently interpreted X-rays - see prior notes.  I have ordered and independently interpreted EKG Reading(s) - see prior notes  Clinical Tests:   Lab Tests: Ordered and Reviewed  Radiological Study: Reviewed and Ordered  Medical Tests: Ordered and Reviewed  ED Management:  EKG shows sinus rhythm with a rate of 79 beats per minute, no STEMI.  CBC shows leukocytosis at 12.82, normal H&H.  UA is unremarkable. CMP is unremarkable. Chest x-ray shows a 5.2 cm x 4.6 cm rim calcification over the left upper quadrant with may be secondary to a calcified cyst versus calcification within the spleen.  Incidental finding, likely unrelated to patient's current complaints.  CT head is unremarkable.  Patient was given IV fluids and IV medication to treat her headache. On reassessment, she reports significant improvement.  Blood pressure is 171/89.  I do not feel warrants further acute intervention the ED.  Patient is counseled symptomatic care treatment, urged to obtain close follow up with PCP for further management of antihypertensives.  Discharged home in stable condition. The patient was instructed to follow up with a primary care provider in 2 days or to return to the emergency department for worsening symptoms. The treatment plan was discussed with the patient who demonstrated  understanding and comfort with plan. The patient's history, physical exam, and plan of care was discussed with and agreed upon with my supervising physician.     This note was created using M Modal Fluency Direct. There may be typographical errors secondary to dictation.                         Clinical Impression:     1. HTN (hypertension)    2. Acute nonintractable headache, unspecified headache type    3. Abnormal chest x-ray           Disposition:   Disposition: Discharged  Condition: Stable                        Angelica Baker PA-C  04/26/19 7804

## 2019-06-30 ENCOUNTER — HOSPITAL ENCOUNTER (EMERGENCY)
Facility: OTHER | Age: 44
Discharge: HOME | End: 2019-06-30
Attending: EMERGENCY MEDICINE
Payer: MEDICARE

## 2019-06-30 VITALS
SYSTOLIC BLOOD PRESSURE: 167 MMHG | HEIGHT: 71 IN | BODY MASS INDEX: 37.1 KG/M2 | DIASTOLIC BLOOD PRESSURE: 71 MMHG | HEART RATE: 87 BPM | OXYGEN SATURATION: 98 % | WEIGHT: 265 LBS | RESPIRATION RATE: 17 BRPM | TEMPERATURE: 98 F

## 2019-06-30 DIAGNOSIS — Z87.828 HISTORY OF TEAR OF MENISCUS OF KNEE JOINT: ICD-10-CM

## 2019-06-30 DIAGNOSIS — M25.562 CHRONIC PAIN OF LEFT KNEE: Primary | ICD-10-CM

## 2019-06-30 DIAGNOSIS — M17.12 OSTEOARTHRITIS OF LEFT KNEE, UNSPECIFIED OSTEOARTHRITIS TYPE: ICD-10-CM

## 2019-06-30 DIAGNOSIS — G89.29 CHRONIC PAIN OF LEFT KNEE: Primary | ICD-10-CM

## 2019-06-30 PROCEDURE — 96372 THER/PROPH/DIAG INJ SC/IM: CPT

## 2019-06-30 PROCEDURE — 25000003 PHARM REV CODE 250: Performed by: EMERGENCY MEDICINE

## 2019-06-30 PROCEDURE — 29505 APPLICATION LONG LEG SPLINT: CPT | Mod: LT

## 2019-06-30 PROCEDURE — 99284 EMERGENCY DEPT VISIT MOD MDM: CPT | Mod: 25

## 2019-06-30 PROCEDURE — 63600175 PHARM REV CODE 636 W HCPCS: Performed by: EMERGENCY MEDICINE

## 2019-06-30 RX ORDER — HYDROCODONE BITARTRATE AND ACETAMINOPHEN 5; 325 MG/1; MG/1
1 TABLET ORAL EVERY 8 HOURS PRN
Qty: 9 TABLET | Refills: 0 | Status: SHIPPED | OUTPATIENT
Start: 2019-06-30 | End: 2019-07-03

## 2019-06-30 RX ORDER — CYCLOBENZAPRINE HCL 10 MG
10 TABLET ORAL 3 TIMES DAILY PRN
Qty: 9 TABLET | Refills: 0 | Status: SHIPPED | OUTPATIENT
Start: 2019-06-30 | End: 2019-07-03

## 2019-06-30 RX ORDER — CYCLOBENZAPRINE HCL 10 MG
10 TABLET ORAL
Status: COMPLETED | OUTPATIENT
Start: 2019-06-30 | End: 2019-06-30

## 2019-06-30 RX ORDER — KETOROLAC TROMETHAMINE 30 MG/ML
30 INJECTION, SOLUTION INTRAMUSCULAR; INTRAVENOUS
Status: COMPLETED | OUTPATIENT
Start: 2019-06-30 | End: 2019-06-30

## 2019-06-30 RX ADMIN — CYCLOBENZAPRINE HYDROCHLORIDE 10 MG: 10 TABLET, FILM COATED ORAL at 07:06

## 2019-06-30 RX ADMIN — KETOROLAC TROMETHAMINE 30 MG: 30 INJECTION, SOLUTION INTRAMUSCULAR; INTRAVENOUS at 07:06

## 2019-07-01 NOTE — ED PROVIDER NOTES
Encounter Date: 2019       History     Chief Complaint   Patient presents with    Knee Pain     Torn meniscus Xs 18 months, pain worsening since Friday, denies recent injury.      43-year-old female with history of medial and lateral meniscus tears of the left knee as well as osteoarthritis of left knee per MRI from 2017 presents complaining of sudden onset of left knee pain which began 3 days ago.  Patient denies any new injuries.  Patient states she was standing in the kitchen when she began feeling sharp pain in the knee and subsequently started having muscle spasms radiating down from the knee to the foot.  She denies any numbness, tingling, or weakness.  She reports taking Tylenol with minimal relief at home.    Her her last clinic visit, the patient was to follow up with Rheumatology as well as continue physical therapy at [facility], lose weight and was supposed to return to clinic with Dr. Davis to discuss a TKA.  She states that since his in January she has yet to follow up with Rheumatology and lose weight.  She also still has to have her procedure approved.        Review of patient's allergies indicates:   Allergen Reactions    Crayfish Swelling     hives    Morphine Swelling     hives    Tramadol Itching and Rash     Past Medical History:   Diagnosis Date    Arthritis     osteoarthritis    Hypertension     Immune disorder     Knee injury     Migraine     Tachycardia      Past Surgical History:   Procedure Laterality Date    ARTHROSCOPY, KNEE - ARTHROSCOPY LATERAL AND MEDIAL MENISCECTOMY Left 10/3/2018    [facility]     SECTION      CHONDROPLASTY, KNEE Left 10/3/2018    Performed by [facility] at [facility] OR    HYSTERECTOMY      lymph nodes      lymph nodes removed      No family history on file.  Social History     Tobacco Use    Smoking status: Former Smoker   Substance Use Topics    Alcohol use: Yes     Alcohol/week: 4.2 oz      Types: 7 Glasses of wine per week    Drug use: Not on file     Review of Systems   Constitutional: Negative for chills, fatigue and fever.   Respiratory: Negative for cough, chest tightness and shortness of breath.    Cardiovascular: Negative for chest pain and palpitations.   Gastrointestinal: Negative for abdominal pain, diarrhea, nausea and vomiting.   Musculoskeletal: Negative for joint swelling and myalgias.   Skin: Negative for color change, rash and wound.   Neurological: Negative for dizziness.       Physical Exam     Initial Vitals [06/30/19 1907]   BP Pulse Resp Temp SpO2   (!) 178/89 98 16 98.2 °F (36.8 °C) 99 %      MAP       --         Physical Exam    Constitutional: She appears well-developed and well-nourished. She is not diaphoretic. No distress.   Obese   HENT:   Head: Normocephalic and atraumatic.   Right Ear: External ear normal.   Left Ear: External ear normal.   Eyes: Conjunctivae are normal.   Neck: Normal range of motion.   Cardiovascular: Normal rate, regular rhythm, normal heart sounds and intact distal pulses.   Pulmonary/Chest: Breath sounds normal. No respiratory distress. She has no wheezes. She has no rhonchi. She has no rales.   Musculoskeletal: Normal range of motion. She exhibits tenderness. She exhibits no edema.   Tenderness palpation along the left knee joint line medial greater than lateral, tenderness palpation of the patella, crepitus present, compartments of the upper and lower leg are soft and nontender, sensation grossly intact, no ecchymosis/erythema/warmth   Neurological: She is alert. She has normal strength.   Skin: Skin is warm. Capillary refill takes less than 2 seconds. No rash noted. No erythema.   Psychiatric: She has a normal mood and affect. Thought content normal.         ED Course   Procedures  Labs Reviewed - No data to display       Imaging Results    None          Medical Decision Making:   Initial Assessment:   43-year-old female with atraumatic left knee  pain.  Patient has a chronic history of left knee pain secondary to meniscus tears as well as osteoarthritis.  She has full range motion of the knee with tenderness to palpation primarily along the medial joint line.  No effusion present on exam.  She is neurovascularly intact.  ED Management:  No imaging necessary at this time.  Patient counseled on the importance of f/u with Dr. Davis to discuss further treatment.  She will be given a Rx for flexeril and norco x 3 days for severe pain.  She was encouraged to continue using the crutches she has been using so far and will also be giving in the immobilizer for additional support.  The patient states she plans to call Dr. Davis is office in the morning for follow-up appointment.                      Clinical Impression:     1. Chronic pain of left knee    2. Osteoarthritis of left knee, unspecified osteoarthritis type    3. History of tear of meniscus of knee joint                                 Betsy Romero MD  06/30/19 1945

## 2019-07-01 NOTE — ED TRIAGE NOTES
Pt reports chronic L knee pain after a torn meniscus x 18 months. Pt reports pain worsened over last 3 days. Denies any reinjury. Pain with ROM or wt bearing